# Patient Record
Sex: FEMALE | Race: WHITE | NOT HISPANIC OR LATINO
[De-identification: names, ages, dates, MRNs, and addresses within clinical notes are randomized per-mention and may not be internally consistent; named-entity substitution may affect disease eponyms.]

---

## 2021-07-12 ENCOUNTER — APPOINTMENT (OUTPATIENT)
Dept: OBGYN | Facility: CLINIC | Age: 51
End: 2021-07-12
Payer: MEDICARE

## 2021-07-12 VITALS
DIASTOLIC BLOOD PRESSURE: 84 MMHG | WEIGHT: 213 LBS | SYSTOLIC BLOOD PRESSURE: 129 MMHG | BODY MASS INDEX: 35.49 KG/M2 | HEIGHT: 65 IN

## 2021-07-12 DIAGNOSIS — E11.9 TYPE 2 DIABETES MELLITUS W/OUT COMPLICATIONS: ICD-10-CM

## 2021-07-12 DIAGNOSIS — R20.9 UNSPECIFIED DISTURBANCES OF SKIN SENSATION: ICD-10-CM

## 2021-07-12 DIAGNOSIS — Z87.898 PERSONAL HISTORY OF OTHER SPECIFIED CONDITIONS: ICD-10-CM

## 2021-07-12 DIAGNOSIS — I10 ESSENTIAL (PRIMARY) HYPERTENSION: ICD-10-CM

## 2021-07-12 DIAGNOSIS — Z80.3 FAMILY HISTORY OF MALIGNANT NEOPLASM OF BREAST: ICD-10-CM

## 2021-07-12 DIAGNOSIS — Z78.9 OTHER SPECIFIED HEALTH STATUS: ICD-10-CM

## 2021-07-12 DIAGNOSIS — Z80.0 FAMILY HISTORY OF MALIGNANT NEOPLASM OF DIGESTIVE ORGANS: ICD-10-CM

## 2021-07-12 DIAGNOSIS — D83.9 COMMON VARIABLE IMMUNODEFICIENCY, UNSPECIFIED: ICD-10-CM

## 2021-07-12 DIAGNOSIS — Z82.49 FAMILY HISTORY OF ISCHEMIC HEART DISEASE AND OTHER DISEASES OF THE CIRCULATORY SYSTEM: ICD-10-CM

## 2021-07-12 DIAGNOSIS — F41.8 OTHER SPECIFIED ANXIETY DISORDERS: ICD-10-CM

## 2021-07-12 PROBLEM — Z00.00 ENCOUNTER FOR PREVENTIVE HEALTH EXAMINATION: Status: ACTIVE | Noted: 2021-07-12

## 2021-07-12 PROCEDURE — G0101: CPT

## 2021-07-12 PROCEDURE — 99386 PREV VISIT NEW AGE 40-64: CPT | Mod: GY

## 2021-07-12 RX ORDER — RAMIPRIL 5 MG/1
5 CAPSULE ORAL
Qty: 90 | Refills: 0 | Status: ACTIVE | COMMUNITY
Start: 2021-05-27

## 2021-07-12 RX ORDER — TOPIRAMATE 25 MG/1
25 TABLET, FILM COATED ORAL
Qty: 90 | Refills: 0 | Status: ACTIVE | COMMUNITY
Start: 2021-05-01

## 2021-07-12 RX ORDER — ARIPIPRAZOLE 2 MG/1
2 TABLET ORAL
Qty: 90 | Refills: 0 | Status: ACTIVE | COMMUNITY
Start: 2021-05-01

## 2021-07-12 RX ORDER — MONTELUKAST 10 MG/1
10 TABLET, FILM COATED ORAL
Qty: 90 | Refills: 0 | Status: ACTIVE | COMMUNITY
Start: 2021-06-15

## 2021-07-12 RX ORDER — HYDROXYZINE HYDROCHLORIDE 25 MG/1
25 TABLET ORAL
Qty: 90 | Refills: 0 | Status: COMPLETED | COMMUNITY
Start: 2021-06-08

## 2021-07-12 RX ORDER — BUSPIRONE HYDROCHLORIDE 10 MG/1
10 TABLET ORAL
Qty: 270 | Refills: 0 | Status: ACTIVE | COMMUNITY
Start: 2021-05-01

## 2021-07-12 NOTE — HISTORY OF PRESENT ILLNESS
[TextBox_4] : Pt presents from Dr. 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  She asked in a support group and was told her symptoms might be autonomic and was recommended to see neuro and endo. Is sweating everywhere - arms/legs.  Also needs a check up. SUmmer has been awful. - just learned yesterday that it might be related to her immune disorder. Doesnt happen in waves - doesn’t think its a hot flash.  Its constant unless she is inside in air conditioning.  [Mammogramdate] : <1 year  [PapSmeardate] : couple of years  [ColonoscopyDate] : has to schedule

## 2021-07-12 NOTE — PHYSICAL EXAM
[Appropriately responsive] : appropriately responsive [Alert] : alert [No Acute Distress] : no acute distress [Soft] : soft [Non-tender] : non-tender [Non-distended] : non-distended [No Lesions] : no lesions [No Mass] : no mass [Oriented x3] : oriented x3 [Examination Of The Breasts] : a normal appearance [No Masses] : no breast masses were palpable [Labia Majora] : normal [Labia Minora] : normal [Normal] : normal [Uterine Adnexae] : normal [FreeTextEntry6] : limited exam

## 2021-07-12 NOTE — DISCUSSION/SUMMARY
[FreeTextEntry1] : recommend pt f/u with immunologist/neurologist/endocrinologist - can consider HRT\par sono for limited exam

## 2021-07-13 ENCOUNTER — ASOB RESULT (OUTPATIENT)
Age: 51
End: 2021-07-13

## 2021-07-13 ENCOUNTER — APPOINTMENT (OUTPATIENT)
Dept: OBGYN | Facility: CLINIC | Age: 51
End: 2021-07-13
Payer: MEDICARE

## 2021-07-13 LAB — HPV HIGH+LOW RISK DNA PNL CVX: NOT DETECTED

## 2021-07-13 PROCEDURE — 76830 TRANSVAGINAL US NON-OB: CPT

## 2021-07-15 LAB — CYTOLOGY CVX/VAG DOC THIN PREP: NORMAL

## 2021-07-19 ENCOUNTER — NON-APPOINTMENT (OUTPATIENT)
Age: 51
End: 2021-07-19

## 2022-08-10 ENCOUNTER — NON-APPOINTMENT (OUTPATIENT)
Age: 52
End: 2022-08-10

## 2022-10-13 ENCOUNTER — APPOINTMENT (OUTPATIENT)
Dept: OBGYN | Facility: CLINIC | Age: 52
End: 2022-10-13

## 2022-12-12 ENCOUNTER — TRANSCRIPTION ENCOUNTER (OUTPATIENT)
Age: 52
End: 2022-12-12

## 2022-12-12 ENCOUNTER — LABORATORY RESULT (OUTPATIENT)
Age: 52
End: 2022-12-12

## 2022-12-12 ENCOUNTER — APPOINTMENT (OUTPATIENT)
Dept: OBGYN | Facility: CLINIC | Age: 52
End: 2022-12-12
Payer: MEDICARE

## 2022-12-12 VITALS
BODY MASS INDEX: 35.82 KG/M2 | DIASTOLIC BLOOD PRESSURE: 91 MMHG | HEIGHT: 65 IN | SYSTOLIC BLOOD PRESSURE: 153 MMHG | WEIGHT: 215 LBS

## 2022-12-12 DIAGNOSIS — L29.2 PRURITUS VULVAE: ICD-10-CM

## 2022-12-12 DIAGNOSIS — Z01.419 ENCOUNTER FOR GYNECOLOGICAL EXAMINATION (GENERAL) (ROUTINE) W/OUT ABNORMAL FINDINGS: ICD-10-CM

## 2022-12-12 PROCEDURE — G0101: CPT | Mod: GA

## 2022-12-12 PROCEDURE — 99396 PREV VISIT EST AGE 40-64: CPT | Mod: GY

## 2022-12-12 RX ORDER — TRAZODONE HYDROCHLORIDE 50 MG/1
50 TABLET ORAL
Qty: 90 | Refills: 0 | Status: ACTIVE | COMMUNITY
Start: 2022-11-11

## 2022-12-12 RX ORDER — GUAIFENESIN AND CODEINE PHOSPHATE 10; 100 MG/5ML; MG/5ML
100-10 SOLUTION ORAL
Qty: 237 | Refills: 0 | Status: COMPLETED | COMMUNITY
Start: 2021-02-02 | End: 2022-12-12

## 2022-12-12 RX ORDER — AMOXICILLIN AND CLAVULANATE POTASSIUM 875; 125 MG/1; MG/1
875-125 TABLET, COATED ORAL
Qty: 20 | Refills: 0 | Status: COMPLETED | COMMUNITY
Start: 2021-07-02 | End: 2022-12-12

## 2022-12-12 RX ORDER — DEXTROAMPHETAMINE SULFATE 10 MG/1
10 TABLET ORAL
Qty: 60 | Refills: 0 | Status: ACTIVE | COMMUNITY
Start: 2022-10-18

## 2022-12-12 RX ORDER — CLONAZEPAM 1 MG/1
1 TABLET ORAL
Qty: 90 | Refills: 0 | Status: COMPLETED | COMMUNITY
Start: 2021-02-02 | End: 2022-12-12

## 2022-12-12 RX ORDER — AZITHROMYCIN 250 MG/1
250 TABLET, FILM COATED ORAL
Qty: 6 | Refills: 0 | Status: COMPLETED | COMMUNITY
Start: 2022-12-02

## 2022-12-12 RX ORDER — METFORMIN HYDROCHLORIDE 1000 MG/1
1000 TABLET, COATED ORAL
Qty: 180 | Refills: 0 | Status: COMPLETED | COMMUNITY
Start: 2021-04-29 | End: 2022-12-12

## 2022-12-12 RX ORDER — ORAL SEMAGLUTIDE 14 MG/1
14 TABLET ORAL
Qty: 90 | Refills: 0 | Status: ACTIVE | COMMUNITY
Start: 2022-11-10

## 2022-12-12 RX ORDER — FLUTICASONE PROPIONATE 50 UG/1
50 SPRAY, METERED NASAL
Qty: 16 | Refills: 0 | Status: ACTIVE | COMMUNITY
Start: 2022-12-02

## 2022-12-12 RX ORDER — METFORMIN ER 500 MG 500 MG/1
500 TABLET ORAL
Qty: 180 | Refills: 0 | Status: ACTIVE | COMMUNITY
Start: 2022-10-19

## 2022-12-12 RX ORDER — VENLAFAXINE HYDROCHLORIDE 75 MG/1
75 CAPSULE, EXTENDED RELEASE ORAL
Qty: 90 | Refills: 0 | Status: ACTIVE | COMMUNITY
Start: 2022-11-23

## 2022-12-12 RX ORDER — MOMETASONE FUROATE 1 MG/G
0.1 CREAM TOPICAL
Qty: 15 | Refills: 0 | Status: COMPLETED | COMMUNITY
Start: 2021-07-07 | End: 2022-12-12

## 2022-12-12 NOTE — PHYSICAL EXAM
[Appropriately responsive] : appropriately responsive [Alert] : alert [No Acute Distress] : no acute distress [Soft] : soft [Non-tender] : non-tender [Non-distended] : non-distended [No Lesions] : no lesions [No Mass] : no mass [Oriented x3] : oriented x3 [Examination Of The Breasts] : a normal appearance [No Masses] : no breast masses were palpable [Labia Majora] : normal [Labia Minora] : normal [Normal] : normal [Uterine Adnexae] : normal [FreeTextEntry1] : perineum discoloration  [FreeTextEntry6] : limited exam

## 2022-12-12 NOTE — DISCUSSION/SUMMARY
[FreeTextEntry1] : would jeb to send urine cx but our toilets are all broken today - pt to return tomorrow or go to lab \par pt to return for vulvar biopsy

## 2022-12-12 NOTE — HISTORY OF PRESENT ILLNESS
[TextBox_4] : 53yo presents for annual exam \par thinks has yeast infection - just finished abx, has itching, burning, white discharge - has been scratching\par also c/o dysuria  - has been getting utis from new diabetic meds and has immune disorder that predisposes her to infections  [PapSmeardate] : 2021

## 2022-12-13 ENCOUNTER — NON-APPOINTMENT (OUTPATIENT)
Age: 52
End: 2022-12-13

## 2022-12-15 LAB
CANDIDA VAG CYTO: DETECTED
G VAGINALIS+PREV SP MTYP VAG QL MICRO: NOT DETECTED
T VAGINALIS VAG QL WET PREP: NOT DETECTED

## 2022-12-16 ENCOUNTER — APPOINTMENT (OUTPATIENT)
Dept: OBGYN | Facility: CLINIC | Age: 52
End: 2022-12-16

## 2022-12-16 DIAGNOSIS — N90.89 OTHER SPECIFIED NONINFLAMMATORY DISORDERS OF VULVA AND PERINEUM: ICD-10-CM

## 2022-12-16 PROCEDURE — 56605 BIOPSY OF VULVA/PERINEUM: CPT

## 2022-12-16 RX ORDER — CLOTRIMAZOLE AND BETAMETHASONE DIPROPIONATE 10; .5 MG/G; MG/G
1-0.05 CREAM TOPICAL TWICE DAILY
Qty: 1 | Refills: 0 | Status: ACTIVE | COMMUNITY
Start: 2022-12-16 | End: 1900-01-01

## 2022-12-16 NOTE — ASSESSMENT
[FreeTextEntry1] : pt states she used monistat3 with relief but today feels like glass down below \par concern for lichen scelrosus\par biopsy performed without complication

## 2022-12-16 NOTE — PROCEDURE
[Vulvar Biopsy] : Vulvar Biopsy [Consent Obtained] : Consent obtained [] : in the Perineum [Size of Biopsy Taken: ___ (mm)] : [unfilled]Umm [____ Lidocaine w/o Epi] : ~VmL lidocaine without epinephrine [Betadine] : Betadine [Sent to Pathology] : placed in buffered formalin and sent for pathology [Punch] : punch biopsy [Silver Nitrate] : silver nitrate [Sutures #___] : [unfilled] sutures [Tolerated Well] : the patient tolerated the procedure well [No Complications] : there were no complications

## 2023-01-02 RX ORDER — CLOBETASOL PROPIONATE 0.5 MG/G
0.05 OINTMENT TOPICAL
Qty: 1 | Refills: 3 | Status: ACTIVE | COMMUNITY
Start: 2023-01-02 | End: 1900-01-01

## 2023-01-03 ENCOUNTER — NON-APPOINTMENT (OUTPATIENT)
Age: 53
End: 2023-01-03

## 2023-01-03 LAB
CORE LAB BIOPSY: NORMAL
CYTOLOGY CVX/VAG DOC THIN PREP: ABNORMAL
HPV HIGH+LOW RISK DNA PNL CVX: DETECTED

## 2023-03-27 ENCOUNTER — APPOINTMENT (OUTPATIENT)
Dept: OBGYN | Facility: CLINIC | Age: 53
End: 2023-03-27
Payer: MEDICARE

## 2023-03-27 VITALS
WEIGHT: 208 LBS | HEIGHT: 65 IN | BODY MASS INDEX: 34.66 KG/M2 | SYSTOLIC BLOOD PRESSURE: 128 MMHG | DIASTOLIC BLOOD PRESSURE: 78 MMHG

## 2023-03-27 PROCEDURE — 99213 OFFICE O/P EST LOW 20 MIN: CPT

## 2023-03-27 NOTE — PHYSICAL EXAM
[Appropriately responsive] : appropriately responsive [Alert] : alert [Oriented x3] : oriented x3 [Labia Majora] : normal [Labia Minora] : normal [Discharge] : a  ~M vaginal discharge was present [Moderate] : moderate [White] : white [Thick] : thick [Normal] : normal [Uterine Adnexae] : normal [FreeTextEntry1] : discoloration around labia minora/perineum, around hemorrhoid - no erythema,no patches, no ulcerations

## 2023-03-27 NOTE — PLAN
[FreeTextEntry1] : literature given - UTD patient info for lichen scelrosus\par affirm sent\par continue current management\par can use boric acid suppository/probiotics\par f/u 6 mo

## 2023-03-27 NOTE — HISTORY OF PRESENT ILLNESS
[TextBox_4] : 51yo presents with c/o flare up of lichen sclerosus  - uses clobetasol but not as often as she thinks she should\par occasionally has yeast infections - mostly around menses \par wondering if she can use boric acid \par no other complaints

## 2023-03-28 LAB
CANDIDA VAG CYTO: NOT DETECTED
G VAGINALIS+PREV SP MTYP VAG QL MICRO: NOT DETECTED
T VAGINALIS VAG QL WET PREP: NOT DETECTED

## 2023-05-03 ENCOUNTER — NON-APPOINTMENT (OUTPATIENT)
Age: 53
End: 2023-05-03

## 2023-05-04 ENCOUNTER — NON-APPOINTMENT (OUTPATIENT)
Age: 53
End: 2023-05-04

## 2023-05-08 ENCOUNTER — NON-APPOINTMENT (OUTPATIENT)
Age: 53
End: 2023-05-08

## 2023-12-18 ENCOUNTER — NON-APPOINTMENT (OUTPATIENT)
Age: 53
End: 2023-12-18

## 2023-12-18 ENCOUNTER — APPOINTMENT (OUTPATIENT)
Dept: OBGYN | Facility: CLINIC | Age: 53
End: 2023-12-18
Payer: MEDICARE

## 2023-12-18 VITALS
BODY MASS INDEX: 32.15 KG/M2 | DIASTOLIC BLOOD PRESSURE: 96 MMHG | SYSTOLIC BLOOD PRESSURE: 147 MMHG | WEIGHT: 193 LBS | HEIGHT: 65 IN

## 2023-12-18 DIAGNOSIS — N92.4 EXCESSIVE BLEEDING IN THE PREMENOPAUSAL PERIOD: ICD-10-CM

## 2023-12-18 PROCEDURE — 99214 OFFICE O/P EST MOD 30 MIN: CPT

## 2023-12-18 NOTE — DISCUSSION/SUMMARY
[FreeTextEntry1] : pt with new onset heavy bleeding ? perimenopausal TVS to rule out other explanation rx aygestin consider sampling  - EMB vs D&C hsyteroscopy as discussed

## 2023-12-18 NOTE — PHYSICAL EXAM
[Chaperone Present] : A chaperone was present in the examining room during all aspects of the physical examination [Appropriately responsive] : appropriately responsive [Alert] : alert [No Acute Distress] : no acute distress [Soft] : soft [Non-tender] : non-tender [Non-distended] : non-distended [Oriented x3] : oriented x3 [Labia Majora] : normal [Labia Minora] : normal [Moderate] : There was moderate vaginal bleeding [Normal] : normal [Uterine Adnexae] : normal [FreeTextEntry1] : discoloration around labia minora/perineum, around hemorrhoid - no erythema,no patches, no ulcerations

## 2023-12-18 NOTE — HISTORY OF PRESENT ILLNESS
[TextBox_4] : 54yo presents with heavy bleeding  pt states she has been bleeding x 12 days - longer than every and very heavy, has been bleeding through clothes - today still wearing super tampon but not oging through her clothes, might be lighter. THis is a long time for her. Periods are still regular.  denies dizziness or lightheadedness.

## 2023-12-21 ENCOUNTER — APPOINTMENT (OUTPATIENT)
Dept: OBGYN | Facility: CLINIC | Age: 53
End: 2023-12-21
Payer: MEDICARE

## 2023-12-21 ENCOUNTER — ASOB RESULT (OUTPATIENT)
Age: 53
End: 2023-12-21

## 2023-12-21 PROCEDURE — 76830 TRANSVAGINAL US NON-OB: CPT

## 2024-01-17 ENCOUNTER — APPOINTMENT (OUTPATIENT)
Dept: OBGYN | Facility: CLINIC | Age: 54
End: 2024-01-17
Payer: MEDICARE

## 2024-01-17 VITALS
WEIGHT: 198 LBS | SYSTOLIC BLOOD PRESSURE: 140 MMHG | HEIGHT: 65 IN | DIASTOLIC BLOOD PRESSURE: 70 MMHG | BODY MASS INDEX: 32.99 KG/M2

## 2024-01-17 DIAGNOSIS — Z80.0 FAMILY HISTORY OF MALIGNANT NEOPLASM OF DIGESTIVE ORGANS: ICD-10-CM

## 2024-01-17 DIAGNOSIS — Z86.59 PERSONAL HISTORY OF OTHER MENTAL AND BEHAVIORAL DISORDERS: ICD-10-CM

## 2024-01-17 DIAGNOSIS — G89.29 DORSALGIA, UNSPECIFIED: ICD-10-CM

## 2024-01-17 DIAGNOSIS — M54.9 DORSALGIA, UNSPECIFIED: ICD-10-CM

## 2024-01-17 DIAGNOSIS — Z78.9 OTHER SPECIFIED HEALTH STATUS: ICD-10-CM

## 2024-01-17 DIAGNOSIS — D83.9 COMMON VARIABLE IMMUNODEFICIENCY, UNSPECIFIED: ICD-10-CM

## 2024-01-17 DIAGNOSIS — Z80.42 FAMILY HISTORY OF MALIGNANT NEOPLASM OF PROSTATE: ICD-10-CM

## 2024-01-17 PROCEDURE — 99214 OFFICE O/P EST MOD 30 MIN: CPT | Mod: 25

## 2024-01-17 PROCEDURE — 58100 BIOPSY OF UTERUS LINING: CPT

## 2024-01-17 RX ORDER — VENLAFAXINE HYDROCHLORIDE 150 MG/1
150 CAPSULE, EXTENDED RELEASE ORAL
Qty: 90 | Refills: 0 | Status: DISCONTINUED | COMMUNITY
Start: 2021-06-08 | End: 2024-01-17

## 2024-01-17 RX ORDER — FLUCONAZOLE 150 MG/1
150 TABLET ORAL
Qty: 2 | Refills: 0 | Status: DISCONTINUED | COMMUNITY
Start: 2021-07-02 | End: 2024-01-17

## 2024-01-17 RX ORDER — CAPSAICIN 0.1 G/100G
0.1 CREAM TOPICAL 4 TIMES DAILY
Qty: 1 | Refills: 0 | Status: ACTIVE | COMMUNITY
Start: 2024-01-17 | End: 1900-01-01

## 2024-01-19 ENCOUNTER — ASOB RESULT (OUTPATIENT)
Age: 54
End: 2024-01-19

## 2024-01-19 ENCOUNTER — APPOINTMENT (OUTPATIENT)
Dept: OBGYN | Facility: CLINIC | Age: 54
End: 2024-01-19
Payer: MEDICARE

## 2024-01-19 PROCEDURE — 76830 TRANSVAGINAL US NON-OB: CPT

## 2024-01-22 PROBLEM — Z80.42 FAMILY HISTORY OF MALIGNANT NEOPLASM OF PROSTATE: Status: ACTIVE | Noted: 2024-01-22

## 2024-01-22 PROBLEM — Z78.9 DOES NOT USE ILLICIT DRUGS: Status: ACTIVE | Noted: 2024-01-22

## 2024-01-22 PROBLEM — Z86.59 HISTORY OF ANXIETY: Status: RESOLVED | Noted: 2024-01-22 | Resolved: 2024-01-22

## 2024-01-22 PROBLEM — Z80.0 FAMILY HISTORY OF MALIGNANT NEOPLASM OF COLON: Status: ACTIVE | Noted: 2024-01-22

## 2024-01-22 PROBLEM — D83.9 COMMON VARIABLE IMMUNODEFICIENCY: Status: RESOLVED | Noted: 2024-01-22 | Resolved: 2024-01-22

## 2024-01-22 PROBLEM — Z86.59 HISTORY OF DEPRESSION: Status: RESOLVED | Noted: 2024-01-22 | Resolved: 2024-01-22

## 2024-01-23 LAB — CORE LAB BIOPSY: NORMAL

## 2024-01-23 NOTE — PHYSICAL EXAM
[Appropriately responsive] : appropriately responsive [Chaperone Present] : A chaperone was present in the examining room during all aspects of the physical examination [Alert] : alert [Soft] : soft [Non-distended] : non-distended [Labia Majora] : normal [Labia Minora] : normal [Scant] : There was scant vaginal bleeding [Normal] : normal [Uterine Adnexae] : normal [FreeTextEntry7] : +tender on b/l abdomen, L>R [Tenderness] : nontender [Enlarged ___ wks] : not enlarged [Mass ___ cm] : no uterine mass was palpated [FreeTextEntry5] : small

## 2024-01-23 NOTE — PLAN
[FreeTextEntry1] : EMB obtained Options for management rev including hysteroscopy with possible resection of abnormal tissue +/- endometrial ablation, hysterectomy or conservative management pending resolution of bleeding  Explained pain mostly likely not related to pelvic etiology given chronic pain and back issues that are longstanding   Will await EMB results then to f/u with me to rediscuss final treatment plan

## 2024-01-23 NOTE — HISTORY OF PRESENT ILLNESS
[Patient reported PAP Smear was abnormal] : Patient reported PAP Smear was abnormal [FreeTextEntry1] : Patient is a 52 yo nulliparous here as a referral from Dr. Robles due to surgical consult needing D&C/hysteroscopy vs possible EMB.  Since 12/2 she has had a heavy menstrual cycle with back ache, hip pain and cramping/fatigue. She has had regular menstrual cycles untli 12/2 where the bleeding never stopped. She feels mood swings with this cycle but normally states this is normal with her cycles. She notes also a history of "overheating" that has been evaluated by her PCP that she notes is not related to hot flashes.   She was told there is a cyst on the R ovary 2.7 cm. She was given Aygestin TID but she says its not helping.  Per chart notes review she had a pelvic u/s done in [place] office noting a [id number] anteverted uterus, EMS 8.9 slight heteregeneous, B/l ovaries overall wnl, clear cyst seen on R ovary measuring 2.7 cm. She notes she has a repeat u/s pending with [place] to look at R ovary again next week. She also underwent a vulvar biopsy last December resulting in lichen sclerosus. She was prescribed Clobetasol cream that she does not use regularly. She had a nromal PAP 2021 but HPV pos NILM 12/2022.   She notes most recently less sexually active due to vaginal discomfort and burning/pain due to lichen sclerosis. She is using the steroid cream prescribed above as needed.   She notes her PCP has given her Flexeril as well and she has started Acupuncture which has helped. She has a history of a herniated lumbar L4 disc as well as scoliosis and back arthritis. She has had prior issues with back strain. She has also had epidural injections due to prior pickle ball injury.    Gyn Hx: HPV pos on last pap 2022.  Denies fibroids, ov cysts, endometriosis. [Mammogramdate] : 2023 [PapSmeardate] : 12/22 [TextBox_31] : NILM/HPV non16/18 pos

## 2024-01-23 NOTE — PROCEDURE
[Endometrial Biopsy] : Endometrial biopsy [Irregular Bleeding] : irregular uterine bleeding [Risks] : risks [Infection] : infection [Alternatives] : alternatives [Benefits] : benefits [Pain] : pain [Bleeding] : bleeding [Betadine] : Betadine [Negative] : negative pregnancy test [Tenaculum] : Tenaculum [Easy Passage] : Easy passage [Sounded to ___ cm] : sounded to [unfilled] ~Ucm [Anteverted] : anteverted [Moderate] : moderate [Specimen Collected] : collected [Sent to Pathology] : placed in buffered formalin and sent for pathology [No Complications] : No complications [Tolerated Well] : Patient tolerated the procedure well [LMPDate] : now

## 2024-01-23 NOTE — REVIEW OF SYSTEMS
[Fatigue] : fatigue [Night Sweats] : night sweats [Abdominal Pain] : abdominal pain [Heartburn] : heartburn [Dysuria] : dysuria [Abn Vaginal bleeding] : abnormal vaginal bleeding [Arthralgias] : arthralgias [Genital Rash/Irritation] : genital rash/irritation [Myalgias] : myalgias [Joint Stiffness] : joint stiffness [Back Pain] : back pain [Headache] : headache [Anxiety] : anxiety [Depression] : depression [Decreased Libido] : decreased libido [Negative] : Heme/Lymph [Pelvic pain] : no pelvic pain

## 2024-02-07 ENCOUNTER — APPOINTMENT (OUTPATIENT)
Dept: OBGYN | Facility: CLINIC | Age: 54
End: 2024-02-07
Payer: MEDICARE

## 2024-02-07 VITALS
SYSTOLIC BLOOD PRESSURE: 120 MMHG | HEIGHT: 65 IN | DIASTOLIC BLOOD PRESSURE: 80 MMHG | HEART RATE: 73 BPM | BODY MASS INDEX: 32.49 KG/M2 | WEIGHT: 195 LBS

## 2024-02-07 DIAGNOSIS — L90.0 LICHEN SCLEROSUS ET ATROPHICUS: ICD-10-CM

## 2024-02-07 DIAGNOSIS — N95.2 POSTMENOPAUSAL ATROPHIC VAGINITIS: ICD-10-CM

## 2024-02-07 DIAGNOSIS — R87.810 CERVICAL HIGH RISK HUMAN PAPILLOMAVIRUS (HPV) DNA TEST POSITIVE: ICD-10-CM

## 2024-02-07 PROCEDURE — 36415 COLL VENOUS BLD VENIPUNCTURE: CPT

## 2024-02-07 PROCEDURE — 99215 OFFICE O/P EST HI 40 MIN: CPT | Mod: 25

## 2024-02-07 RX ORDER — CONJUGATED ESTROGENS 0.62 MG/G
0.62 CREAM VAGINAL
Qty: 1 | Refills: 3 | Status: ACTIVE | COMMUNITY
Start: 2024-02-07 | End: 1900-01-01

## 2024-02-07 NOTE — PLAN
[FreeTextEntry1] : Again we reviewed options of management regarding her AUB/prolonged menstrual cycle including withdrawal from Aygestin and watching her symptoms vs hysteroscopy with resection of polyp with D&C +/- ablation or definitive tx with hysterectomy - she is most interested in definitive tx with hysterectomy  We reviewed r/b/a of above surgery and that it will be done MIS lap or robotic approach - total hysterectomy, b/l salpingectomy, cystoscopy   We discussed r/b/a of surgery including bleeding, infection, damage to internal visceral organs such as bowel, bladder, nerves, vessels, and ureters, fistulas, thrombotic events, stroke, MI, death. Possible need for laparotomy also was reviewed. We reviewed operative risks being < 5%.  She is amenable to blood products Post op course was reviewed We discussed recovery All questions were answered She will undergo ERAS protocol with poss same day discharge if she qualifies  Rx sent for vaginal estrogen cream as her discomfort/dyspareunia is more atrophy than lichen - instructed how to use  To try capsaicin if she desires for back pain  We also addressed issues with libido and possible etiology being multifactorial pain and vulvovaginal discomfort - I offered if she wanted to address this first we could hold off above until we are improved with this category but she felt she wanted to complete surgery first. We will readdress this after surgery pending symptoms after she has initiated estrogen cream  Will need PCP clearance given comorbidities and EKG to be completed with PCP and faxed to us - to review with PCP and PST how long to be off Rybelsus  CBC,CMP,A1c done in office [place] that we will gladly fax to her PCP Dr. Lopez Needs immunology clearance given weekly infusions and if anything needs to be adjusted perioperatively

## 2024-02-07 NOTE — RESULTS/DATA
[TextEntry] : 1/17/2024  Final Diagnosis Endometrial biopsy:     - Inactive endometrium with exogeneous hormonal effects     - Endometrial polyp  MRI pelvis 1/21/2024 Uteurs measures 8x4x5 cm, EMS 3 mm, cervical nabothian cyst, no adeno, polyp or fibroid. O 2 x 2 x 2cm, RO 2 x 2 1cm. No ff, mass, lymphadenoapthy or endometriosis.

## 2024-02-07 NOTE — HISTORY OF PRESENT ILLNESS
[Patient reported PAP Smear was abnormal] : Patient reported PAP Smear was abnormal [FreeTextEntry1] : Patient is a 54 yo nulliparous here for a f/u. Today she notes having back pain and did not  the Capsaicin I suggested for back pain as states pharmacy never called her. EMB was done last visit due to prolonged menstrual cycle noting inactive endometrium, exogenous hormonal effects and endometrial polyp. We had previously discussed hysteroscopy with possible resection of abnormal tissue +/- endometrial ablation, hysterectomy or conservative management pending resolution of bleeding coming off Aygestin. She is here with her mother today. She brought a copy of MRI pelvis that was ordered by her chronic pain doctor (Dr. Baugh) and the MRI report is normal with 8 cm uterus, normal ovaries, thin stripe.   Prior history: She was seen previously by me a referral from Dr. Robles due to surgical consult needing D&C/hysteroscopy. Since 12/2 she has had a heavy menstrual cycle with back ache, hip pain and cramping/fatigue. She has had regular menstrual cycles untli 12/2 where the bleeding never stopped. She feels mood swings with this cycle but normally states this is normal with her cycles. She notes also a history of "overheating" that has been evaluated by her PCP that she notes is not related to hot flashes. She was given Aygestin TID and she says she only has spotting now.   Per chart notes review she had a pelvic u/s done in [place] office noting a [id number] anteverted uterus, EMS 8.9 slight heteregeneous, B/l ovaries overall wnl, clear cyst seen on R ovary measuring 2.7 cm. She notes she has a repeat u/s done with [place] to look at R ovary again and the cyst had resolved.   She also underwent a vulvar biopsy last December resulting in lichen sclerosus. She was prescribed Clobetasol cream that she does not use regularly. She had a nromal PAP 2021 but HPV pos NILM 12/2022. She notes most recently less sexually active due to vaginal discomfort with sex and burning/pain due to lichen sclerosis. She is using the steroid cream prescribed above as needed. She notes currently she is more bothered by penetrative discomfort and burning than that happening all the time. She is interested in how to increase her libido.   She notes her PCP has given her Flexeril as well and she has started Acupuncture which has helped greatl. She has a history of a herniated lumbar L4 disc as well as scoliosis and back arthritis. She has had prior issues with back strain. She has also had epidural injections due to prior pickle ball injury. Last A1c per patient was around 6.1. She sees immunology due to CVID (gets weekly infusions of Cuvitru) and she has a PCP she sees in [place] (Dr. Whittington).    Gyn Hx: HPV pos on last pap 2022.  Denies fibroids, STI's, endometriosis. [Mammogramdate] : 2023 [PapSmeardate] : 12/22 [TextBox_31] : NILM/HPV non16/18 pos

## 2024-02-07 NOTE — REVIEW OF SYSTEMS
[Fatigue] : fatigue [Night Sweats] : night sweats [Abdominal Pain] : abdominal pain [Heartburn] : heartburn [Dysuria] : dysuria [Abn Vaginal bleeding] : abnormal vaginal bleeding [Genital Rash/Irritation] : genital rash/irritation [Arthralgias] : arthralgias [Myalgias] : myalgias [Joint Stiffness] : joint stiffness [Back Pain] : back pain [Headache] : headache [Anxiety] : anxiety [Depression] : depression [Decreased Libido] : decreased libido [Negative] : Heme/Lymph [Pelvic pain] : no pelvic pain

## 2024-02-09 LAB
ALBUMIN SERPL ELPH-MCNC: 4.7 G/DL
ALP BLD-CCNC: 61 U/L
ALT SERPL-CCNC: 18 U/L
ANION GAP SERPL CALC-SCNC: 16 MMOL/L
AST SERPL-CCNC: 16 U/L
BASOPHILS # BLD AUTO: 0.04 K/UL
BASOPHILS NFR BLD AUTO: 0.4 %
BILIRUB SERPL-MCNC: 0.2 MG/DL
BUN SERPL-MCNC: 9 MG/DL
CALCIUM SERPL-MCNC: 9.9 MG/DL
CHLORIDE SERPL-SCNC: 106 MMOL/L
CO2 SERPL-SCNC: 15 MMOL/L
CREAT SERPL-MCNC: 0.85 MG/DL
EGFR: 82 ML/MIN/1.73M2
EOSINOPHIL # BLD AUTO: 0.09 K/UL
EOSINOPHIL NFR BLD AUTO: 0.9 %
ESTIMATED AVERAGE GLUCOSE: 134 MG/DL
GLUCOSE SERPL-MCNC: 133 MG/DL
HBA1C MFR BLD HPLC: 6.3 %
HCT VFR BLD CALC: 46.7 %
HGB BLD-MCNC: 15 G/DL
IMM GRANULOCYTES NFR BLD AUTO: 0.3 %
LYMPHOCYTES # BLD AUTO: 2.6 K/UL
LYMPHOCYTES NFR BLD AUTO: 26 %
MAN DIFF?: NORMAL
MCHC RBC-ENTMCNC: 28.5 PG
MCHC RBC-ENTMCNC: 32.1 GM/DL
MCV RBC AUTO: 88.6 FL
MONOCYTES # BLD AUTO: 0.63 K/UL
MONOCYTES NFR BLD AUTO: 6.3 %
NEUTROPHILS # BLD AUTO: 6.6 K/UL
NEUTROPHILS NFR BLD AUTO: 66.1 %
PLATELET # BLD AUTO: 475 K/UL
POTASSIUM SERPL-SCNC: 4.5 MMOL/L
PROT SERPL-MCNC: 7.9 G/DL
RBC # BLD: 5.27 M/UL
RBC # FLD: 14 %
SODIUM SERPL-SCNC: 136 MMOL/L
WBC # FLD AUTO: 9.99 K/UL

## 2024-03-11 ENCOUNTER — OUTPATIENT (OUTPATIENT)
Dept: OUTPATIENT SERVICES | Facility: HOSPITAL | Age: 54
LOS: 1 days | End: 2024-03-11

## 2024-03-11 VITALS
RESPIRATION RATE: 18 BRPM | HEIGHT: 64 IN | HEART RATE: 100 BPM | TEMPERATURE: 99 F | DIASTOLIC BLOOD PRESSURE: 76 MMHG | WEIGHT: 182.1 LBS | SYSTOLIC BLOOD PRESSURE: 112 MMHG | OXYGEN SATURATION: 96 %

## 2024-03-11 DIAGNOSIS — I10 ESSENTIAL (PRIMARY) HYPERTENSION: ICD-10-CM

## 2024-03-11 DIAGNOSIS — E11.9 TYPE 2 DIABETES MELLITUS WITHOUT COMPLICATIONS: ICD-10-CM

## 2024-03-11 DIAGNOSIS — N93.9 ABNORMAL UTERINE AND VAGINAL BLEEDING, UNSPECIFIED: ICD-10-CM

## 2024-03-11 DIAGNOSIS — Z98.890 OTHER SPECIFIED POSTPROCEDURAL STATES: Chronic | ICD-10-CM

## 2024-03-11 DIAGNOSIS — R10.2 PELVIC AND PERINEAL PAIN: ICD-10-CM

## 2024-03-11 DIAGNOSIS — R06.83 SNORING: ICD-10-CM

## 2024-03-11 LAB
APPEARANCE UR: ABNORMAL
BILIRUB UR-MCNC: NEGATIVE — SIGNIFICANT CHANGE UP
BLD GP AB SCN SERPL QL: NEGATIVE — SIGNIFICANT CHANGE UP
COLOR SPEC: YELLOW — SIGNIFICANT CHANGE UP
DIFF PNL FLD: NEGATIVE — SIGNIFICANT CHANGE UP
GLUCOSE UR QL: NEGATIVE MG/DL — SIGNIFICANT CHANGE UP
HCG SERPL-ACNC: <1 MIU/ML — SIGNIFICANT CHANGE UP
KETONES UR-MCNC: ABNORMAL MG/DL
LEUKOCYTE ESTERASE UR-ACNC: NEGATIVE — SIGNIFICANT CHANGE UP
NITRITE UR-MCNC: NEGATIVE — SIGNIFICANT CHANGE UP
PH UR: 5.5 — SIGNIFICANT CHANGE UP (ref 5–8)
PROT UR-MCNC: SIGNIFICANT CHANGE UP MG/DL
RH IG SCN BLD-IMP: POSITIVE — SIGNIFICANT CHANGE UP
RH IG SCN BLD-IMP: POSITIVE — SIGNIFICANT CHANGE UP
SP GR SPEC: 1.03 — SIGNIFICANT CHANGE UP (ref 1–1.03)
UROBILINOGEN FLD QL: 1 MG/DL — SIGNIFICANT CHANGE UP (ref 0.2–1)

## 2024-03-11 NOTE — H&P PST ADULT - PROBLEM SELECTOR PLAN 1
Scheduled for Robotic Total Laparoscopic Hysterectomy, Bilateral Salpingectomy, Cystoscopy on 03/18/24.  Preop instructions provided and patient verbalizes understanding.  Labs done and results pending.  Famotidine provided with instructions. Hibiclens provided with instructions and was signed by patient. Teach-back method was utilized to assess patient's understanding. Patient verbalized understanding.

## 2024-03-11 NOTE — H&P PST ADULT - NSICDXFAMILYHX_GEN_ALL_CORE_FT
FAMILY HISTORY:  Father  Still living? Yes, Estimated age: 71-80  Family hx of prostate cancer, Age at diagnosis: Age Unknown    Mother  Still living? Yes, Estimated age: 71-80  FH: diabetes mellitus, Age at diagnosis: Age Unknown

## 2024-03-11 NOTE — H&P PST ADULT - NSANTHOSAYNRD_GEN_A_CORE
No. JOO screening performed.  STOP BANG Legend: 0-2 = LOW Risk; 3-4 = INTERMEDIATE Risk; 5-8 = HIGH Risk

## 2024-03-11 NOTE — H&P PST ADULT - ATTENDING COMMENTS
Consent was signed at the bedside for robotic assisted total laparoscopic hysterectomy, bilateral salpingectomy, cystoscopy and any additional indicated procedures. Off Ozempic x 8 days. Cleared from PCP and immunology regarding proceeding, preop note in chart. Sites marked, questions answered. No changes.

## 2024-03-11 NOTE — H&P PST ADULT - NSICDXPASTMEDICALHX_GEN_ALL_CORE_FT
PAST MEDICAL HISTORY:  Anxiety and depression     Bulging lumbar disc     CVID (common variable immunodeficiency)     History of inguinal hernia     HTN (hypertension)     Lichen sclerosus of vulva     Lumbar scoliosis     Type 2 diabetes mellitus      PAST MEDICAL HISTORY:  Abnormal uterine and vaginal bleeding, unspecified     Anxiety and depression     Bulging lumbar disc     CVID (common variable immunodeficiency)     History of inguinal hernia     HTN (hypertension)     Lichen sclerosus of vulva     Lumbar scoliosis     Type 2 diabetes mellitus

## 2024-03-11 NOTE — H&P PST ADULT - NSICDXPASTSURGICALHX_GEN_ALL_CORE_FT
PAST SURGICAL HISTORY:  H/O bilateral breast reduction surgery     H/O bilateral inguinal hernia repair

## 2024-03-11 NOTE — H&P PST ADULT - HISTORY OF PRESENT ILLNESS
53 yr old female with medical hx HTN, T2DM, CVID (last episode of bronchitis was 4mths), liches sclerosis of vulva, anxiety and depression presents for preop evaluation with c/o abnormal vaginal bleeding, hot flashes and abnormal pap.  Patient s/p sonogram and biopsy and treated with Aygestin for bleeding with no relief.  Patient is now scheduled for Robotic Total Laparoscopic Hysterectomy, Bilateral Salpingectomy, Cystoscopy tentatively on 03/18/24.

## 2024-03-12 LAB
CULTURE RESULTS: SIGNIFICANT CHANGE UP
SPECIMEN SOURCE: SIGNIFICANT CHANGE UP

## 2024-03-14 ENCOUNTER — NON-APPOINTMENT (OUTPATIENT)
Age: 54
End: 2024-03-14

## 2024-03-17 ENCOUNTER — NON-APPOINTMENT (OUTPATIENT)
Age: 54
End: 2024-03-17

## 2024-03-17 ENCOUNTER — TRANSCRIPTION ENCOUNTER (OUTPATIENT)
Age: 54
End: 2024-03-17

## 2024-03-18 ENCOUNTER — RESULT REVIEW (OUTPATIENT)
Age: 54
End: 2024-03-18

## 2024-03-18 ENCOUNTER — TRANSCRIPTION ENCOUNTER (OUTPATIENT)
Age: 54
End: 2024-03-18

## 2024-03-18 ENCOUNTER — APPOINTMENT (OUTPATIENT)
Dept: OBGYN | Facility: CLINIC | Age: 54
End: 2024-03-18

## 2024-03-18 ENCOUNTER — OUTPATIENT (OUTPATIENT)
Dept: INPATIENT UNIT | Facility: HOSPITAL | Age: 54
LOS: 1 days | Discharge: HOME | End: 2024-03-18

## 2024-03-18 VITALS
RESPIRATION RATE: 16 BRPM | HEART RATE: 103 BPM | DIASTOLIC BLOOD PRESSURE: 77 MMHG | SYSTOLIC BLOOD PRESSURE: 133 MMHG | OXYGEN SATURATION: 95 %

## 2024-03-18 VITALS
TEMPERATURE: 98 F | HEIGHT: 64 IN | SYSTOLIC BLOOD PRESSURE: 128 MMHG | OXYGEN SATURATION: 99 % | HEART RATE: 104 BPM | RESPIRATION RATE: 17 BRPM | DIASTOLIC BLOOD PRESSURE: 95 MMHG | WEIGHT: 182.1 LBS

## 2024-03-18 DIAGNOSIS — R10.2 PELVIC AND PERINEAL PAIN: ICD-10-CM

## 2024-03-18 DIAGNOSIS — Z98.890 OTHER SPECIFIED POSTPROCEDURAL STATES: Chronic | ICD-10-CM

## 2024-03-18 LAB
GLUCOSE BLDC GLUCOMTR-MCNC: 133 MG/DL — HIGH (ref 70–99)
HCG UR QL: NEGATIVE — SIGNIFICANT CHANGE UP

## 2024-03-18 RX ORDER — TOPIRAMATE 25 MG
1 TABLET ORAL
Refills: 0 | DISCHARGE

## 2024-03-18 RX ORDER — VENLAFAXINE HCL 75 MG
1 CAPSULE, EXT RELEASE 24 HR ORAL
Refills: 0 | DISCHARGE

## 2024-03-18 RX ORDER — HUMAN IMMUNOGLOBULIN G 0.2 G/ML
60 LIQUID SUBCUTANEOUS
Refills: 0 | DISCHARGE

## 2024-03-18 RX ORDER — ONDANSETRON 8 MG/1
4 TABLET, FILM COATED ORAL ONCE
Refills: 0 | Status: DISCONTINUED | OUTPATIENT
Start: 2024-03-18 | End: 2024-03-18

## 2024-03-18 RX ORDER — CHLORHEXIDINE GLUCONATE 213 G/1000ML
1 SOLUTION TOPICAL DAILY
Refills: 0 | Status: DISCONTINUED | OUTPATIENT
Start: 2024-03-18 | End: 2024-03-18

## 2024-03-18 RX ORDER — SODIUM CHLORIDE 9 MG/ML
1000 INJECTION, SOLUTION INTRAVENOUS
Refills: 0 | Status: DISCONTINUED | OUTPATIENT
Start: 2024-03-18 | End: 2024-03-18

## 2024-03-18 RX ORDER — TRAZODONE HCL 50 MG
1 TABLET ORAL
Refills: 0 | DISCHARGE

## 2024-03-18 RX ORDER — PHENAZOPYRIDINE HCL 100 MG
100 TABLET ORAL ONCE
Refills: 0 | Status: COMPLETED | OUTPATIENT
Start: 2024-03-18 | End: 2024-03-18

## 2024-03-18 RX ORDER — CYCLOBENZAPRINE HYDROCHLORIDE 10 MG/1
1 TABLET, FILM COATED ORAL
Refills: 0 | DISCHARGE

## 2024-03-18 RX ORDER — RAMIPRIL 5 MG
1 CAPSULE ORAL
Refills: 0 | DISCHARGE

## 2024-03-18 RX ORDER — OXYCODONE HYDROCHLORIDE 5 MG/1
1 TABLET ORAL
Qty: 10 | Refills: 0
Start: 2024-03-18

## 2024-03-18 RX ORDER — ACETAMINOPHEN 500 MG
975 TABLET ORAL ONCE
Refills: 0 | Status: COMPLETED | OUTPATIENT
Start: 2024-03-18 | End: 2024-03-18

## 2024-03-18 RX ORDER — MONTELUKAST 4 MG/1
1 TABLET, CHEWABLE ORAL
Refills: 0 | DISCHARGE

## 2024-03-18 RX ORDER — CELECOXIB 200 MG/1
200 CAPSULE ORAL ONCE
Refills: 0 | Status: COMPLETED | OUTPATIENT
Start: 2024-03-18 | End: 2024-03-18

## 2024-03-18 RX ORDER — SEMAGLUTIDE 0.68 MG/ML
0 INJECTION, SOLUTION SUBCUTANEOUS
Refills: 0 | DISCHARGE

## 2024-03-18 RX ORDER — GABAPENTIN 400 MG/1
300 CAPSULE ORAL ONCE
Refills: 0 | Status: COMPLETED | OUTPATIENT
Start: 2024-03-18 | End: 2024-03-18

## 2024-03-18 RX ORDER — METFORMIN HYDROCHLORIDE 850 MG/1
1 TABLET ORAL
Refills: 0 | DISCHARGE

## 2024-03-18 RX ORDER — FENTANYL CITRATE 50 UG/ML
50 INJECTION INTRAVENOUS
Refills: 0 | Status: DISCONTINUED | OUTPATIENT
Start: 2024-03-18 | End: 2024-03-18

## 2024-03-18 RX ORDER — CLONAZEPAM 1 MG
1 TABLET ORAL
Refills: 0 | DISCHARGE

## 2024-03-18 RX ADMIN — SODIUM CHLORIDE 30 MILLILITER(S): 9 INJECTION, SOLUTION INTRAVENOUS at 06:54

## 2024-03-18 RX ADMIN — Medication 975 MILLIGRAM(S): at 09:12

## 2024-03-18 RX ADMIN — CELECOXIB 200 MILLIGRAM(S): 200 CAPSULE ORAL at 09:12

## 2024-03-18 RX ADMIN — Medication 100 MILLIGRAM(S): at 06:56

## 2024-03-18 RX ADMIN — CHLORHEXIDINE GLUCONATE 1 APPLICATION(S): 213 SOLUTION TOPICAL at 06:00

## 2024-03-18 RX ADMIN — CELECOXIB 200 MILLIGRAM(S): 200 CAPSULE ORAL at 06:56

## 2024-03-18 RX ADMIN — GABAPENTIN 300 MILLIGRAM(S): 400 CAPSULE ORAL at 06:53

## 2024-03-18 RX ADMIN — Medication 975 MILLIGRAM(S): at 06:53

## 2024-03-18 NOTE — BRIEF OPERATIVE NOTE - NSICDXBRIEFPOSTOP_GEN_ALL_CORE_FT
POST-OP DIAGNOSIS:  Pain pelvic 18-Mar-2024 11:17:31  Nan Villanueva  Abnormal uterine bleeding (AUB) 18-Mar-2024 11:17:35  Nan Vlilanueva   POST-OP DIAGNOSIS:  Abnormal uterine bleeding (AUB) 18-Mar-2024 11:17:35  Nan Villanueva  Female dyspareunia 18-Mar-2024 21:45:38  Nan Villanueva

## 2024-03-18 NOTE — ASU PREOP CHECKLIST - 1.
fs     hysterectomy bundle started in asu fs          hysterectomy bundle started in asu fs     133 @[id number]      hysterectomy bundle started in asu

## 2024-03-18 NOTE — ASU DISCHARGE PLAN (ADULT/PEDIATRIC) - NS MD DC FALL RISK RISK
For information on Fall & Injury Prevention, visit: [email or web address] OR  [email or web address] OR  https://www.cdc.gov/steadi/patient.html

## 2024-03-18 NOTE — BRIEF OPERATIVE NOTE - NSICDXBRIEFPROCEDURE_GEN_ALL_CORE_FT
PROCEDURES:  Robot-assisted laparoscopic hysterectomy using da Radha Xi with cystoscopy 18-Mar-2024 11:16:39  Nan Villanueva  Robot-assisted bilateral salpingectomy 18-Mar-2024 11:16:48  Nan Villanueva  Pelvic examination under anesthesia 18-Mar-2024 11:16:56  Nan Villanueva

## 2024-03-18 NOTE — CHART NOTE - NSCHARTNOTEFT_GEN_A_CORE
POST-OP CHECK NOTE @ 1258    SUBJECTIVE:    54yo F now POD0 s/p RA-TLH, BS, and Cysto. Pain controlled. Patient OOB and has voided. Tolerating sips of clear liquids w/o n/v. Denies fevers, chills, n/v, chest pain, or shortness of breath     chlorhexidine 2% Cloths 1 Application(s) Topical daily  fentaNYL    Injectable 50 MICROGram(s) IV Push every 5 minutes PRN  lactated ringers. 1000 milliLiter(s) IV Continuous <Continuous>  ondansetron Injectable 4 milliGRAM(s) IV Push once PRN      OBJECTIVE:    VITAL SIGNS:  Vital Signs Last 24 Hrs  T(C): 36.5 (18 Mar 2024 12:39), Max: 37 (18 Mar 2024 10:50)  T(F): 97.7 (18 Mar 2024 12:39), Max: 98.6 (18 Mar 2024 10:50)  HR: 93 (18 Mar 2024 12:39) (93 - 108)  BP: 134/91 (18 Mar 2024 12:39) (123/84 - 144/58)  BP(mean): 89 (18 Mar 2024 12:15) (77 - 102)  RR: 16 (18 Mar 2024 12:39) (10 - 17)  SpO2: 105% (18 Mar 2024 12:39) (95% - 105%)    Parameters below as of 18 Mar 2024 12:15  Patient On (Oxygen Delivery Method): room air      CAPILLARY BLOOD GLUCOSE      POCT Blood Glucose.: 133 mg/dL (18 Mar 2024 06:20)      03-18-24 @ 07:01  -  03-18-24 @ 12:58  --------------------------------------------------------  IN: 300 mL / OUT: 0 mL / NET: 300 mL        PHYSICAL EXAM:  GEN: No acute distress. Awake. Alert   CV: Regular rate and rhythm on bedside monitor w/ HR in 90s   LUNGS: Unlabored breathing. No respiratory distress  ABD: Soft. Non-tender. Non-distended   Incision: Lsc sites CDI w/ dressings in place  EXT: No calf tenderness bilaterally    LABS:            ASSESSMENT:  54yo F now POD0 s/p RA-TLH, BSO, and Cysto. Patient has reassuring VS, is hemodynamically stable, and is progressing appropriately post-op. Patient has met all post-op milestones    NEURO: Pain controlled on current regimen  CV: Hemodynamically stable  PULM: Saturating well on RA  GI: Advance diet as tolerated. Zofran PRN  : Due to void***  HEME: SCDs. Early ambulation   ID: Afebrile  Dispo: Home once meeting post-operatively milestones     Tom Mccullough, PGY-2  Obstetrics and Gynecology    d/w Dr. DANG Lemus POST-OP CHECK NOTE @ 1258    SUBJECTIVE:    54yo F now POD0 s/p RA-TLH, BS, and Cysto. Pain controlled. Patient OOB and has voided. Tolerating sips of clear liquids w/o n/v. Denies fevers, chills, n/v, chest pain, or shortness of breath     chlorhexidine 2% Cloths 1 Application(s) Topical daily  fentaNYL    Injectable 50 MICROGram(s) IV Push every 5 minutes PRN  lactated ringers. 1000 milliLiter(s) IV Continuous <Continuous>  ondansetron Injectable 4 milliGRAM(s) IV Push once PRN      OBJECTIVE:    VITAL SIGNS:  Vital Signs Last 24 Hrs  T(C): 36.5 (18 Mar 2024 12:39), Max: 37 (18 Mar 2024 10:50)  T(F): 97.7 (18 Mar 2024 12:39), Max: 98.6 (18 Mar 2024 10:50)  HR: 93 (18 Mar 2024 12:39) (93 - 108)  BP: 134/91 (18 Mar 2024 12:39) (123/84 - 144/58)  BP(mean): 89 (18 Mar 2024 12:15) (77 - 102)  RR: 16 (18 Mar 2024 12:39) (10 - 17)  SpO2: 105% (18 Mar 2024 12:39) (95% - 105%)    Parameters below as of 18 Mar 2024 12:15  Patient On (Oxygen Delivery Method): room air      CAPILLARY BLOOD GLUCOSE      POCT Blood Glucose.: 133 mg/dL (18 Mar 2024 06:20)      03-18-24 @ 07:01  -  03-18-24 @ 12:58  --------------------------------------------------------  IN: 300 mL / OUT: 0 mL / NET: 300 mL        PHYSICAL EXAM:  GEN: No acute distress. Awake. Alert   CV: Regular rate and rhythm on bedside monitor w/ HR in 90s   LUNGS: Unlabored breathing. No respiratory distress  ABD: Soft. Non-tender. Non-distended   Incision: Lsc sites CDI w/ dressings in place  EXT: No calf tenderness bilaterally    LABS:            ASSESSMENT:  54yo F now POD0 s/p RA-TLH, BSO, and Cysto. Patient has reassuring VS, is hemodynamically stable, and is progressing appropriately post-op. Patient has met all post-op milestones    NEURO: Pain controlled on current regimen  CV: Hemodynamically stable  PULM: Saturating well on RA  GI: Advance diet as tolerated. Zofran PRN  : Voiding spontaneously   HEME: SCDs. Early ambulation   ID: Afebrile  Dispo: Home once meeting post-operatively milestones     Tom Mccullough, PGY-2  Obstetrics and Gynecology    d/w Dr. DANG Lemus

## 2024-03-18 NOTE — ASU DISCHARGE PLAN (ADULT/PEDIATRIC) - CARE PROVIDER_API CALL
Torrie Lemus.  Obstetrics and Gynecology  [address], [address]  [place] [address]  Phone: [phone]  Fax: [phone]  Follow Up Time: 2 weeks

## 2024-03-18 NOTE — BRIEF OPERATIVE NOTE - OPERATION/FINDINGS
Breast: right nipple inverted  EUA: 8cm anteverted uterus, no adnexal masses palpated bilaterally. Labia minora atrophic and agglutinated.  Speculum: normal cervix and vaginal tissue  Laparoscopy: normal uterus, bilateral fallopian tubes, ovaries. Bilateral adnexal vessels engorged, R>L. Mild pelvic adhesions between left adnexa and pelvic side wall. 2cm simple follicular cyst on right ovary. Bilateral ureters seen vermiculating bilaterally retroperitoneally. Normal omentum and bowel.  Cysto: intact bladder dome with air bubble visible. Bilateral ureteral orifices with efflux seen multiple times on each side.

## 2024-03-18 NOTE — BRIEF OPERATIVE NOTE - NSICDXBRIEFPREOP_GEN_ALL_CORE_FT
PRE-OP DIAGNOSIS:  Pain pelvic 18-Mar-2024 11:17:11  Nan Villanueva  Abnormal uterine bleeding (AUB) 18-Mar-2024 11:17:23  Nan Villanueva   PRE-OP DIAGNOSIS:  Abnormal uterine bleeding (AUB) 18-Mar-2024 11:17:23  Nan Villanueva  Female dyspareunia 18-Mar-2024 21:45:33  Nan Villanueva

## 2024-03-18 NOTE — ASU DISCHARGE PLAN (ADULT/PEDIATRIC) - ASU DC SPECIAL INSTRUCTIONSFT
Alternate Tylenol 975mg q6 hrs and Motrin 600mg q6hrs so that you are taking pain medication every 3 hrs. Take oxycodone for breakthrough pain. Take Miralax PRN for constipation, especially if taking oxycodone. Vaginal spotting for the next couple of days is normal.  If heavy vaginal bleeding, foul smelling discharge, fevers, or pain not controlled with oral pain meds, please contact your provider or go to the emergency room.  Nothing in the vagina and no soaking in water (baths or swimming) for the next six weeks. Please call Dr. Lemus's office for a 2 week follow-up.

## 2024-03-18 NOTE — ASU PREOP CHECKLIST - WEIGHT IN LBS
[address] [facility]  Progress Note - Thai Pea 1942, 78 y o  male MRN: [id number]  Unit/Bed#: [facility] 306-01 Encounter: [id number]  Primary Care Provider: Davion Edwards durbin, to discuss with outpatient urologist Dr Dany Caro on discharge, continue chronic Durbin [facility] Elizabeth Fragoso, MD      folic acid  125 mcg Oral Daily Elizabeth Fragoso, MD      glycerin-hypromellose-  182.1

## 2024-03-18 NOTE — BRIEF OPERATIVE NOTE - COMMENTS
Dictation per Dr. Lemus Dictation per Dr. Allan DONG, Km Wayne PA-C, served as the first assistant in this operation. I assisted in placing ports, docking, and targeting the da Radha robot, first assisted at the surgical field while the surgeon was performing the operation at the robotic console by providing instrument exchanges, tissue retraction, suction and irrigation, specimen retrieval, passing and removing sutures and sponges, undocking the robotic platform, and closed surgical wounds.

## 2024-03-19 PROBLEM — N93.9 ABNORMAL UTERINE AND VAGINAL BLEEDING, UNSPECIFIED: Chronic | Status: ACTIVE | Noted: 2024-03-11

## 2024-03-19 PROBLEM — E11.9 TYPE 2 DIABETES MELLITUS WITHOUT COMPLICATIONS: Chronic | Status: ACTIVE | Noted: 2024-03-11

## 2024-03-19 PROBLEM — D83.9 COMMON VARIABLE IMMUNODEFICIENCY, UNSPECIFIED: Chronic | Status: ACTIVE | Noted: 2024-03-11

## 2024-03-21 ENCOUNTER — APPOINTMENT (OUTPATIENT)
Dept: OBGYN | Facility: CLINIC | Age: 54
End: 2024-03-21

## 2024-03-26 LAB — SURGICAL PATHOLOGY STUDY: SIGNIFICANT CHANGE UP

## 2024-04-04 ENCOUNTER — APPOINTMENT (OUTPATIENT)
Dept: OBGYN | Facility: CLINIC | Age: 54
End: 2024-04-04
Payer: MEDICARE

## 2024-04-04 VITALS
HEART RATE: 70 BPM | SYSTOLIC BLOOD PRESSURE: 118 MMHG | DIASTOLIC BLOOD PRESSURE: 72 MMHG | BODY MASS INDEX: 29.16 KG/M2 | HEIGHT: 65 IN | WEIGHT: 175 LBS

## 2024-04-04 DIAGNOSIS — R10.2 PELVIC AND PERINEAL PAIN: ICD-10-CM

## 2024-04-04 DIAGNOSIS — N94.10 UNSPECIFIED DYSPAREUNIA: ICD-10-CM

## 2024-04-04 DIAGNOSIS — R87.810 CERVICAL HIGH RISK HUMAN PAPILLOMAVIRUS (HPV) DNA TEST POSITIVE: ICD-10-CM

## 2024-04-04 DIAGNOSIS — R68.82 DECREASED LIBIDO: ICD-10-CM

## 2024-04-04 DIAGNOSIS — N93.9 ABNORMAL UTERINE AND VAGINAL BLEEDING, UNSPECIFIED: ICD-10-CM

## 2024-04-04 DIAGNOSIS — N84.0 POLYP OF CORPUS UTERI: ICD-10-CM

## 2024-04-04 DIAGNOSIS — N94.89 OTHER SPECIFIED CONDITIONS ASSOCIATED WITH FEMALE GENITAL ORGANS AND MENSTRUAL CYCLE: ICD-10-CM

## 2024-04-04 PROBLEM — Z87.19 PERSONAL HISTORY OF OTHER DISEASES OF THE DIGESTIVE SYSTEM: Chronic | Status: ACTIVE | Noted: 2024-03-11

## 2024-04-04 PROBLEM — M51.36 OTHER INTERVERTEBRAL DISC DEGENERATION, LUMBAR REGION: Chronic | Status: ACTIVE | Noted: 2024-03-11

## 2024-04-04 PROBLEM — I10 ESSENTIAL (PRIMARY) HYPERTENSION: Chronic | Status: ACTIVE | Noted: 2024-03-11

## 2024-04-04 PROBLEM — N90.4 LEUKOPLAKIA OF VULVA: Chronic | Status: ACTIVE | Noted: 2024-03-11

## 2024-04-04 PROBLEM — F41.9 ANXIETY DISORDER, UNSPECIFIED: Chronic | Status: ACTIVE | Noted: 2024-03-11

## 2024-04-04 PROBLEM — M41.9 SCOLIOSIS, UNSPECIFIED: Chronic | Status: ACTIVE | Noted: 2024-03-11

## 2024-04-04 LAB
BILIRUB UR QL STRIP: NORMAL
GLUCOSE UR-MCNC: NORMAL
HCG UR QL: 0.2 EU/DL
HGB UR QL STRIP.AUTO: NORMAL
KETONES UR-MCNC: NORMAL
LEUKOCYTE ESTERASE UR QL STRIP: NORMAL
NITRITE UR QL STRIP: NORMAL
PH UR STRIP: 5.5
PROT UR STRIP-MCNC: NORMAL
SP GR UR STRIP: 1.03

## 2024-04-04 PROCEDURE — 81003 URINALYSIS AUTO W/O SCOPE: CPT | Mod: QW

## 2024-04-04 PROCEDURE — 99024 POSTOP FOLLOW-UP VISIT: CPT

## 2024-04-04 NOTE — HISTORY OF PRESENT ILLNESS
[Pain is well-controlled] : pain is well-controlled [Diarrhea] : diarrhea [Vaginal Discharge] : vaginal discharge [Clean/Dry/Intact] : clean, dry and intact [Healed] : healed [Mild] : mild vaginal bleeding [Normal] : normal [Pathology reviewed] : pathology reviewed [Fever] : no fever [Chills] : no chills [Nausea] : no nausea [Vomiting] : no vomiting [Vaginal Bleeding] : no vaginal bleeding [Pelvic Pressure] : no pelvic pressure [Dysuria] : no dysuria [Constipation] : no constipation [Erythema] : not erythematous [Swelling] : not swollen [Dehiscence] : not dehisced [Discharge] : absent of discharge [de-identified] : Patient reports doing well but a few days after surgery caught bronchitis given her immune condition. She notes she typically can get it someitmes up to 15 times per year. Was treated with Z pack and cough medicine from her PCP. Reports tolerating PO, no pain but felt a twinge with bending over. Noted some brownish smelly discharge over past few days. Has been on pelvic rest. No other bleeding. Notes some diarrhea. Feels her lichen is also flaring.  [de-identified] : non tender abdomen, port sites x 5 well healed, vagina without bright red/active bleeding, small amount of tan/brown discharge, non purulent, no cuff erythema, palpably intact [TextEntry] : PATH:   Final Diagnosis Uterus, cervix, bilateral fallopian tubes, total hysterectomy with bilateral salpingectomy: - Inactive endometrium with exogenous hormonal effect. - Benign cervix.  - Fallopian tubes with reactive changes.

## 2024-04-04 NOTE — REASON FOR VISIT
[Post Op Day: ___] : Post-Op Day:  #[unfilled] [Procedure: ___] : Procedure: [unfilled] [Indication: ___] : Indication: [unfilled] [de-identified] : Surgery date: 3/18/2024

## 2024-04-04 NOTE — PLAN
[FreeTextEntry1] : Patient will need continued PAP surveillance of vaginal cuff given recent HPV positive on 2022 PAP Vaginitis/affirm swab taken given mild brownish vaginal discharge No erythema of vagina or cuff Mild hemorrhoids - reviewed using topical agents Reviewed continued avoidance of heavy lifting/straining Will send urine for cx given urine dip with small LE, mod blood  RTO for second post op check 3-4 weeks  Ok to use her steroid cream for lichen nightly x 2 weeks then can begin taper Use fiber for bulking of stool Support offered regarding both parents recently needing hospital visits father with fall mother with covid  She has a therapist she sees  Surgical photos rev and given to patient Path reviewed Adnexal vasculature was engorged consistent with pelvic congestion syndrome

## 2024-04-08 DIAGNOSIS — N30.00 ACUTE CYSTITIS W/OUT HEMATURIA: ICD-10-CM

## 2024-04-08 LAB — BACTERIA UR CULT: ABNORMAL

## 2024-04-08 RX ORDER — CIPROFLOXACIN HYDROCHLORIDE 500 MG/1
500 TABLET, FILM COATED ORAL
Qty: 3 | Refills: 0 | Status: ACTIVE | COMMUNITY
Start: 2024-04-08 | End: 1900-01-01

## 2024-04-12 RX ORDER — ACETAMINOPHEN 500 MG
3 TABLET ORAL
Qty: 0 | Refills: 0 | DISCHARGE

## 2024-04-12 RX ORDER — POLYETHYLENE GLYCOL 3350 17 G/17G
0 POWDER, FOR SOLUTION ORAL
Qty: 0 | Refills: 0 | DISCHARGE

## 2024-04-12 RX ORDER — IBUPROFEN 200 MG
1 TABLET ORAL
Qty: 0 | Refills: 0 | DISCHARGE

## 2024-04-22 ENCOUNTER — NON-APPOINTMENT (OUTPATIENT)
Age: 54
End: 2024-04-22

## 2024-04-24 ENCOUNTER — APPOINTMENT (OUTPATIENT)
Dept: OBGYN | Facility: CLINIC | Age: 54
End: 2024-04-24
Payer: MEDICARE

## 2024-04-24 VITALS
BODY MASS INDEX: 28.32 KG/M2 | SYSTOLIC BLOOD PRESSURE: 120 MMHG | WEIGHT: 170 LBS | HEIGHT: 65 IN | HEART RATE: 142 BPM | DIASTOLIC BLOOD PRESSURE: 66 MMHG

## 2024-04-24 VITALS — HEART RATE: 120 BPM

## 2024-04-24 DIAGNOSIS — N76.1 SUBACUTE AND CHRONIC VAGINITIS: ICD-10-CM

## 2024-04-24 DIAGNOSIS — N89.8 OTHER SPECIFIED NONINFLAMMATORY DISORDERS OF VAGINA: ICD-10-CM

## 2024-04-24 DIAGNOSIS — R31.29 OTHER MICROSCOPIC HEMATURIA: ICD-10-CM

## 2024-04-24 DIAGNOSIS — Z98.890 OTHER SPECIFIED POSTPROCEDURAL STATES: ICD-10-CM

## 2024-04-24 PROCEDURE — 81003 URINALYSIS AUTO W/O SCOPE: CPT | Mod: QW

## 2024-04-24 PROCEDURE — 99024 POSTOP FOLLOW-UP VISIT: CPT

## 2024-04-24 RX ORDER — IMMUNE GLOBULIN SUBCUTANEOUS (HUMAN) 200 MG/ML
10 INJECTION, SOLUTION SUBCUTANEOUS
Refills: 0 | Status: ACTIVE | COMMUNITY

## 2024-04-24 RX ORDER — OMEPRAZOLE 20 MG/1
20 TABLET, DELAYED RELEASE ORAL
Refills: 0 | Status: ACTIVE | COMMUNITY

## 2024-04-24 RX ORDER — NORETHINDRONE ACETATE 5 MG/1
5 TABLET ORAL EVERY 8 HOURS
Qty: 90 | Refills: 1 | Status: DISCONTINUED | COMMUNITY
Start: 2023-12-18 | End: 2024-04-24

## 2024-04-26 LAB
BACTERIA UR CULT: NORMAL
BILIRUB UR QL STRIP: NORMAL
CANDIDA VAG CYTO: NOT DETECTED
G VAGINALIS+PREV SP MTYP VAG QL MICRO: NOT DETECTED
GLUCOSE UR-MCNC: NORMAL
HCG UR QL: 0.2 EU/DL
HGB UR QL STRIP.AUTO: NORMAL
KETONES UR-MCNC: NORMAL
LEUKOCYTE ESTERASE UR QL STRIP: NORMAL
NITRITE UR QL STRIP: NORMAL
PH UR STRIP: 5.5
PROT UR STRIP-MCNC: 100
SP GR UR STRIP: 1.03
T VAGINALIS VAG QL WET PREP: NOT DETECTED

## 2024-04-26 NOTE — PLAN
[FreeTextEntry1] : Patient will need continued PAP surveillance of vaginal cuff given recent HPV positive on 2022 PAP Vaginitis/affirm swab taken given mild brownish vaginal discharge No erythema of vagina or cuff Mild hemorrhoids - reviewed using topical agents previously and using Fiber/Miralax for regular BM's Reviewed continued avoidance of heavy lifting/straining, cont pelvic rest x 3-4 weeks Will send urine for cx given urine dip with trace blood Ok to use her steroid cream for lichen nightly x 2 weeks then can begin taper - rev prior visit  Support offered regarding dad with brain bleed/recurrent fall  She has a therapist she sees  Surgical photos rev and given to patient and path report   Adnexal vasculature was engorged consistent with pelvic congestion syndrome  To RTO in 3 months to re eval libido and therapy for that likely multifactorial and strongly affected with environmental stressors

## 2024-04-26 NOTE — REASON FOR VISIT
[Post Op Day: ___] : Post-Op Day:  #[unfilled] [Procedure: ___] : Procedure: [unfilled] [Indication: ___] : Indication: [unfilled] [de-identified] : Surgery date: 3/18/2024

## 2024-04-26 NOTE — HISTORY OF PRESENT ILLNESS
[Pain is well-controlled] : pain is well-controlled [Vaginal Discharge] : vaginal discharge [Clean/Dry/Intact] : clean, dry and intact [Healed] : healed [Mild] : mild vaginal bleeding [Normal] : normal [Pathology reviewed] : pathology reviewed [Fever] : no fever [Chills] : no chills [Nausea] : no nausea [Vomiting] : no vomiting [Diarrhea] : no diarrhea [Vaginal Bleeding] : no vaginal bleeding [Pelvic Pressure] : no pelvic pressure [Dysuria] : no dysuria [Constipation] : no constipation [Erythema] : not erythematous [Swelling] : not swollen [Dehiscence] : not dehisced [Discharge] : absent of discharge [de-identified] : Patient reports doing well but after taking Cipro for a UTI felt she got itchy and got a yeast infection. She took OTC Monistat x 3 days and felt improved but then had a day of bright red spotting followed by a day of brownish staining Monday and Tuesday. She has continued to be on pelvic rest. A few days after surgery caught bronchitis given her immune condition. She notes she typically can get it sometimes up to 15 times per year. Was treated with Z pack and cough medicine from her PCP. Reports currently no urinary issues and still mildly constipated and feeling pelvic pressure, rare abdominal twinges PO, no pain otherwise. Noted some brownish discharge over past few days where on 4/22 was brighter red then yesterday was brownish, small amount and not associated with any pain. Has been on pelvic rest. No other bleeding. Notes not yet using bowel aids. No issues regarding her lichen.  [de-identified] : non tender abdomen, port sites x 5 well healed, vagina without bright red/active bleeding, small amount of tan/brown discharge, non purulent, no cuff erythema, palpably intact and visibly intact several areas of sutures still seen and palpated on left corner [TextEntry] : PATH:   Final Diagnosis Uterus, cervix, bilateral fallopian tubes, total hysterectomy with bilateral salpingectomy: - Inactive endometrium with exogenous hormonal effect. - Benign cervix.  - Fallopian tubes with reactive changes.

## 2024-07-24 ENCOUNTER — APPOINTMENT (OUTPATIENT)
Dept: OBGYN | Facility: CLINIC | Age: 54
End: 2024-07-24

## 2024-08-28 ENCOUNTER — APPOINTMENT (OUTPATIENT)
Dept: OBGYN | Facility: CLINIC | Age: 54
End: 2024-08-28

## 2024-09-03 NOTE — BRIEF OPERATIVE NOTE - VENOUS THROMBOEMBOLISM PROPHYLAXIS THERAPY
Was the patient seen in the last year in this department? Yes     Does patient have an active prescription for medications requested? Yes     Received Request Via: Pharmacy     HSQ hand

## 2024-10-30 ENCOUNTER — APPOINTMENT (OUTPATIENT)
Dept: OBGYN | Facility: CLINIC | Age: 54
End: 2024-10-30

## 2024-10-30 VITALS
WEIGHT: 162 LBS | HEART RATE: 96 BPM | DIASTOLIC BLOOD PRESSURE: 70 MMHG | SYSTOLIC BLOOD PRESSURE: 124 MMHG | BODY MASS INDEX: 26.99 KG/M2 | HEIGHT: 65 IN

## 2024-10-30 DIAGNOSIS — N76.1 SUBACUTE AND CHRONIC VAGINITIS: ICD-10-CM

## 2024-10-30 DIAGNOSIS — N39.0 URINARY TRACT INFECTION, SITE NOT SPECIFIED: ICD-10-CM

## 2024-10-30 DIAGNOSIS — Z87.42 PERSONAL HISTORY OF OTHER DISEASES OF THE FEMALE GENITAL TRACT: ICD-10-CM

## 2024-10-30 DIAGNOSIS — N94.10 UNSPECIFIED DYSPAREUNIA: ICD-10-CM

## 2024-10-30 DIAGNOSIS — N89.8 OTHER SPECIFIED NONINFLAMMATORY DISORDERS OF VAGINA: ICD-10-CM

## 2024-10-30 LAB
BILIRUB UR QL STRIP: NORMAL
GLUCOSE UR-MCNC: NORMAL
HCG UR QL: 0.2 EU/DL
HGB UR QL STRIP.AUTO: NORMAL
KETONES UR-MCNC: NORMAL
LEUKOCYTE ESTERASE UR QL STRIP: NORMAL
NITRITE UR QL STRIP: NORMAL
PH UR STRIP: 5.5
PROT UR STRIP-MCNC: NORMAL
SP GR UR STRIP: 1.01

## 2024-10-30 PROCEDURE — 81003 URINALYSIS AUTO W/O SCOPE: CPT | Mod: QW

## 2024-10-30 PROCEDURE — 99459 PELVIC EXAMINATION: CPT

## 2024-10-30 PROCEDURE — 99214 OFFICE O/P EST MOD 30 MIN: CPT | Mod: 25

## 2024-10-31 ENCOUNTER — NON-APPOINTMENT (OUTPATIENT)
Age: 54
End: 2024-10-31

## 2024-10-31 RX ORDER — ESTRADIOL 0.1 MG/G
0.1 CREAM VAGINAL
Qty: 1 | Refills: 3 | Status: ACTIVE | COMMUNITY
Start: 2024-10-31 | End: 1900-01-01

## 2024-11-01 DIAGNOSIS — B96.89 ACUTE VAGINITIS: ICD-10-CM

## 2024-11-01 DIAGNOSIS — N76.0 ACUTE VAGINITIS: ICD-10-CM

## 2024-11-01 LAB
BACTERIA UR CULT: NORMAL
BILIRUB UR QL STRIP: NORMAL
CANDIDA VAG CYTO: NOT DETECTED
G VAGINALIS+PREV SP MTYP VAG QL MICRO: DETECTED
GLUCOSE UR-MCNC: NORMAL
HCG UR QL: 0.2 EU/DL
HGB UR QL STRIP.AUTO: NORMAL
KETONES UR-MCNC: NORMAL
LEUKOCYTE ESTERASE UR QL STRIP: NORMAL
NITRITE UR QL STRIP: NORMAL
PH UR STRIP: 5.5
PROT UR STRIP-MCNC: NORMAL
SP GR UR STRIP: 1.01
T VAGINALIS VAG QL WET PREP: NOT DETECTED

## 2024-11-01 RX ORDER — METRONIDAZOLE 7.5 MG/G
0.75 GEL VAGINAL
Qty: 1 | Refills: 0 | Status: ACTIVE | COMMUNITY
Start: 2024-11-01 | End: 1900-01-01

## 2024-11-18 ENCOUNTER — NON-APPOINTMENT (OUTPATIENT)
Age: 54
End: 2024-11-18

## 2024-12-05 ENCOUNTER — APPOINTMENT (OUTPATIENT)
Dept: OBGYN | Facility: CLINIC | Age: 54
End: 2024-12-05

## 2024-12-05 VITALS
HEIGHT: 65 IN | WEIGHT: 167 LBS | BODY MASS INDEX: 27.82 KG/M2 | SYSTOLIC BLOOD PRESSURE: 120 MMHG | DIASTOLIC BLOOD PRESSURE: 74 MMHG | HEART RATE: 80 BPM

## 2024-12-05 DIAGNOSIS — N89.8 OTHER SPECIFIED NONINFLAMMATORY DISORDERS OF VAGINA: ICD-10-CM

## 2024-12-05 DIAGNOSIS — K64.9 UNSPECIFIED HEMORRHOIDS: ICD-10-CM

## 2024-12-05 DIAGNOSIS — L90.0 LICHEN SCLEROSUS ET ATROPHICUS: ICD-10-CM

## 2024-12-05 PROCEDURE — 99214 OFFICE O/P EST MOD 30 MIN: CPT

## 2024-12-05 RX ORDER — PRAMOXINE HYDROCHLORIDE 150 MG/15G
1 AEROSOL, FOAM RECTAL TWICE DAILY
Qty: 1 | Refills: 1 | Status: ACTIVE | COMMUNITY
Start: 2024-12-05 | End: 1900-01-01

## 2024-12-07 DIAGNOSIS — N76.0 ACUTE VAGINITIS: ICD-10-CM

## 2024-12-07 DIAGNOSIS — B96.89 ACUTE VAGINITIS: ICD-10-CM

## 2024-12-07 LAB
CANDIDA VAG CYTO: NOT DETECTED
G VAGINALIS+PREV SP MTYP VAG QL MICRO: DETECTED
T VAGINALIS VAG QL WET PREP: NOT DETECTED

## 2024-12-07 RX ORDER — METRONIDAZOLE 500 MG/1
500 TABLET ORAL TWICE DAILY
Qty: 14 | Refills: 0 | Status: ACTIVE | COMMUNITY
Start: 2024-12-07 | End: 1900-01-01

## 2024-12-10 ENCOUNTER — NON-APPOINTMENT (OUTPATIENT)
Age: 54
End: 2024-12-10

## 2024-12-16 PROBLEM — R39.9 URINARY TRACT INFECTION SYMPTOMS: Status: ACTIVE | Noted: 2024-12-16

## 2024-12-17 ENCOUNTER — NON-APPOINTMENT (OUTPATIENT)
Age: 54
End: 2024-12-17

## 2024-12-18 ENCOUNTER — APPOINTMENT (OUTPATIENT)
Dept: OBGYN | Facility: CLINIC | Age: 54
End: 2024-12-18

## 2024-12-18 VITALS
BODY MASS INDEX: 29.16 KG/M2 | HEIGHT: 65 IN | HEART RATE: 80 BPM | WEIGHT: 175 LBS | DIASTOLIC BLOOD PRESSURE: 60 MMHG | SYSTOLIC BLOOD PRESSURE: 122 MMHG

## 2024-12-18 PROCEDURE — 99214 OFFICE O/P EST MOD 30 MIN: CPT

## 2024-12-19 ENCOUNTER — NON-APPOINTMENT (OUTPATIENT)
Age: 54
End: 2024-12-19

## 2024-12-20 ENCOUNTER — NON-APPOINTMENT (OUTPATIENT)
Age: 54
End: 2024-12-20

## 2024-12-25 LAB
MYCOPLASMA HOMINIS CULTURE: NEGATIVE
UREAPLASMA CULTURE: NEGATIVE

## 2024-12-26 ENCOUNTER — NON-APPOINTMENT (OUTPATIENT)
Age: 54
End: 2024-12-26

## 2025-01-12 PROBLEM — N76.0 BACTERIAL VAGINOSIS: Status: ACTIVE | Noted: 2025-01-12 | Resolved: 2025-02-11

## 2025-01-16 ENCOUNTER — NON-APPOINTMENT (OUTPATIENT)
Age: 55
End: 2025-01-16

## 2025-01-16 ENCOUNTER — APPOINTMENT (OUTPATIENT)
Facility: CLINIC | Age: 55
End: 2025-01-16

## 2025-03-27 ENCOUNTER — APPOINTMENT (OUTPATIENT)
Facility: CLINIC | Age: 55
End: 2025-03-27

## (undated) DEVICE — XI SYNCHROSEAL VESSEL SEALER 8MM

## (undated) DEVICE — POSITIONER PINK PAD PIGAZZI SYSTEM

## (undated) DEVICE — Device

## (undated) DEVICE — XI ARM NEEDLE DRIVER MEGA

## (undated) DEVICE — POSITIONER STRAP ARMBOARD VELCRO TS-30

## (undated) DEVICE — XI ARM FORCEP FENESTRATED BIPOLAR 8MM

## (undated) DEVICE — SUT VICRYL 0 27" CT-2 UNDYED

## (undated) DEVICE — ENDOCATCH 10MM SPECIMEN POUCH

## (undated) DEVICE — SI OBTURATOR BLADELESS 8MM

## (undated) DEVICE — XI ARM PERMANENT CAUTERY SPATULA

## (undated) DEVICE — XI SEAL UNIVERSIAL 5-12MM

## (undated) DEVICE — SYR LUER LOK 50CC

## (undated) DEVICE — SUT VICRYL 0 27" UR-6

## (undated) DEVICE — XI ARM SCISSOR MONO CURVED

## (undated) DEVICE — SUT VLOC 180 2-0 12" V-20 GREEN

## (undated) DEVICE — SOL IRR BAG NS 0.9% 1000ML

## (undated) DEVICE — XI ARM FORCEP MARYLAND BIPOLAR

## (undated) DEVICE — RUMI COLPO-PNEUMO OCCLUDER

## (undated) DEVICE — TRAP SPECIMEN SPUTUM 40CC

## (undated) DEVICE — TROCAR SURGIQUEST AIRSEAL 5MM X 100MM

## (undated) DEVICE — UTERINE MANIPULATOR DELINEATOR SC 3MM SM

## (undated) DEVICE — XI TIP COVER

## (undated) DEVICE — XI DRAPE COLUMN

## (undated) DEVICE — SUT MONOCRYL 4-0 27" PS-2 UNDYED

## (undated) DEVICE — DRAPE UNDER BUTTOCKS W SCREEN

## (undated) DEVICE — POSITIONER PURPLE ARM ONE STEP (LARGE)

## (undated) DEVICE — XI OBTURATOR OPTICAL BLADELESS 8MM

## (undated) DEVICE — XI DRAPE ARM

## (undated) DEVICE — GOWN XL

## (undated) DEVICE — PACK D&C

## (undated) DEVICE — XI ARM DISSECTOR CURVED BIPOLAR 8MM

## (undated) DEVICE — PACK PERI GYN

## (undated) DEVICE — GOWN LG

## (undated) DEVICE — FOLEY TRAY 16FR 5CC LF UMETER CLOSED

## (undated) DEVICE — D HELP - CLEARVIEW CLEARIFY SYSTEM

## (undated) DEVICE — POSITIONER FOAM HEAD CRADLE (PINK)

## (undated) DEVICE — XI ARM PERMANENT CAUTERY HOOK

## (undated) DEVICE — PACK ROBOTIC LIJ

## (undated) DEVICE — SUT VLOC 180 0 12" GS-21 GREEN

## (undated) DEVICE — TUBING STRYKEFLOW II SUCTION / IRRIGATOR

## (undated) DEVICE — ELCTR BOVIE TIP BLADE INSULATED 2.75" EDGE

## (undated) DEVICE — DRSG OPSITE 13.75 X 4"

## (undated) DEVICE — ELCTR BOVIE PENCIL SMOKE EVACUATION

## (undated) DEVICE — PREP BETADINE SPONGE STICKS

## (undated) DEVICE — SYR LUER LOK 10CC

## (undated) DEVICE — XI ARM FORCEP TENACULUM

## (undated) DEVICE — TUBING AIRSEAL TRI-LUMEN FILTERED